# Patient Record
Sex: FEMALE | Race: WHITE | NOT HISPANIC OR LATINO | Employment: UNEMPLOYED | URBAN - METROPOLITAN AREA
[De-identification: names, ages, dates, MRNs, and addresses within clinical notes are randomized per-mention and may not be internally consistent; named-entity substitution may affect disease eponyms.]

---

## 2018-04-10 ENCOUNTER — TELEPHONE (OUTPATIENT)
Dept: NEUROLOGY | Facility: CLINIC | Age: 58
End: 2018-04-10

## 2018-04-10 NOTE — TELEPHONE ENCOUNTER
----- Message from Stella Davila RN sent at 4/9/2018  9:17 AM CDT -----  Contact: Patient @ [phone]      ----- Message -----  From: Sherwin Duran  Sent: 4/9/2018   8:58 AM  To: [facility] Staff    Caller ( dr vogel office ) is calling to get an update on the NP appt to consult for headaches, pt is being referred by Dr Anthony Vogel

## 2018-04-10 NOTE — TELEPHONE ENCOUNTER
Spoke with Mrs. Solo-    I explained that I was calling in regards to scheduling an appt for her to see Dr. Mccall for Migraines, I also explained that we received a referral from Dr. Melgoza's office. She informed me that she will call back to schedule when ready.

## 2020-07-08 ENCOUNTER — CLINICAL SUPPORT (OUTPATIENT)
Dept: URGENT CARE | Facility: CLINIC | Age: 60
End: 2020-07-08
Payer: COMMERCIAL

## 2020-07-08 DIAGNOSIS — Z01.84 ENCOUNTER FOR ANTIBODY RESPONSE EXAMINATION: ICD-10-CM

## 2020-07-08 LAB — SARS-COV-2 IGG SERPLBLD QL IA.RAPID: NEGATIVE

## 2020-07-08 PROCEDURE — 86769 SARS-COV-2 COVID-19 ANTIBODY: CPT

## 2020-07-11 ENCOUNTER — TELEPHONE (OUTPATIENT)
Dept: URGENT CARE | Facility: CLINIC | Age: 60
End: 2020-07-11

## 2020-07-11 NOTE — TELEPHONE ENCOUNTER
Patient called and wanted to know the results to her Antibody Covid test on 07/11/2020 at 11:15am.